# Patient Record
Sex: FEMALE | Race: WHITE
[De-identification: names, ages, dates, MRNs, and addresses within clinical notes are randomized per-mention and may not be internally consistent; named-entity substitution may affect disease eponyms.]

---

## 2018-02-05 ENCOUNTER — HOSPITAL ENCOUNTER (OUTPATIENT)
Dept: HOSPITAL 62 - LC | Age: 28
Discharge: HOME | End: 2018-02-05
Attending: OBSTETRICS & GYNECOLOGY
Payer: COMMERCIAL

## 2018-02-05 DIAGNOSIS — O48.0: Primary | ICD-10-CM

## 2018-02-05 DIAGNOSIS — Z3A.40: ICD-10-CM

## 2018-02-05 PROCEDURE — 59025 FETAL NON-STRESS TEST: CPT

## 2018-02-05 PROCEDURE — 4A1HXCZ MONITORING OF PRODUCTS OF CONCEPTION, CARDIAC RATE, EXTERNAL APPROACH: ICD-10-PCS | Performed by: OBSTETRICS & GYNECOLOGY

## 2018-02-05 NOTE — NON STRESS TEST REPORT
=================================================================

Non Stress Test

=================================================================

Datetime Report Generated by CPN: 02/05/2018 12:40

   

   

=================================================================

DEMOGRAPHIC

=================================================================

   

EGA NST:  40.5

   

=================================================================

INDICATION

=================================================================

   

Indication for Study:  Ordered by Provider

   

=================================================================

MONITORING

=================================================================

   

Monitor Explained:  Monitor Explained; Test Explained; Patient

   Verbalized Understanding

Time on Monitor:  02/05/2018 11:46

Time off Monitor:  02/05/2018 12:11

NST Duration:  25

   

=================================================================

NST INTERVENTIONS

=================================================================

   

NST Interventions:  PO Hydration; Reposition Patient

Physician Notified NST:  H.Arthur, CNM

BABY A:  [id number]

   

=================================================================

BABY A

=================================================================

   

Fetal Movement :  Present

Contraction Frequency :  None

FHR Baseline :  125

Accelerations :  15X15

Decelerations :  None

Variability :  Moderate 6-25bpm

NST Review:  Meets Criteria for Reactive NST

NST Review and Verified By :  FINESSE LONDONO Results:  Reactive

   

=================================================================

NST REPORT

=================================================================

   

Report Trigger:  Send Report

## 2018-02-06 ENCOUNTER — HOSPITAL ENCOUNTER (INPATIENT)
Dept: HOSPITAL 62 - LR | Age: 28
LOS: 2 days | Discharge: HOME | End: 2018-02-08
Attending: OBSTETRICS & GYNECOLOGY | Admitting: OBSTETRICS & GYNECOLOGY
Payer: COMMERCIAL

## 2018-02-06 DIAGNOSIS — Z3A.40: ICD-10-CM

## 2018-02-06 DIAGNOSIS — Z87.891: ICD-10-CM

## 2018-02-06 DIAGNOSIS — O48.0: ICD-10-CM

## 2018-02-06 DIAGNOSIS — Z67.41: ICD-10-CM

## 2018-02-06 DIAGNOSIS — O26.893: ICD-10-CM

## 2018-02-06 LAB
ADD MANUAL DIFF: NO
APPEARANCE UR: (no result)
APTT PPP: YELLOW S
BARBITURATES UR QL SCN: NEGATIVE
BASOPHILS # BLD AUTO: 0 10^3/UL (ref 0–0.2)
BASOPHILS NFR BLD AUTO: 0.2 % (ref 0–2)
BILIRUB UR QL STRIP: NEGATIVE
EOSINOPHIL # BLD AUTO: 0 10^3/UL (ref 0–0.6)
EOSINOPHIL NFR BLD AUTO: 0.4 % (ref 0–6)
ERYTHROCYTE [DISTWIDTH] IN BLOOD BY AUTOMATED COUNT: 13.9 % (ref 11.5–14)
GLUCOSE UR STRIP-MCNC: NEGATIVE MG/DL
HCT VFR BLD CALC: 34.5 % (ref 36–47)
HGB BLD-MCNC: 11.4 G/DL (ref 12–15.5)
KETONES UR STRIP-MCNC: NEGATIVE MG/DL
LYMPHOCYTES # BLD AUTO: 2.2 10^3/UL (ref 0.5–4.7)
LYMPHOCYTES NFR BLD AUTO: 23.3 % (ref 13–45)
MCH RBC QN AUTO: 29 PG (ref 27–33.4)
MCHC RBC AUTO-ENTMCNC: 33.1 G/DL (ref 32–36)
MCV RBC AUTO: 88 FL (ref 80–97)
METHADONE UR QL SCN: NEGATIVE
MONOCYTES # BLD AUTO: 0.8 10^3/UL (ref 0.1–1.4)
MONOCYTES NFR BLD AUTO: 8.3 % (ref 3–13)
NEUTROPHILS # BLD AUTO: 6.3 10^3/UL (ref 1.7–8.2)
NEUTS SEG NFR BLD AUTO: 67.8 % (ref 42–78)
NITRITE UR QL STRIP: NEGATIVE
PCP UR QL SCN: NEGATIVE
PH UR STRIP: 5 [PH] (ref 5–9)
PLATELET # BLD: 156 10^3/UL (ref 150–450)
PROT UR STRIP-MCNC: NEGATIVE MG/DL
RBC # BLD AUTO: 3.93 10^6/UL (ref 3.72–5.28)
SP GR UR STRIP: 1.02
TOTAL CELLS COUNTED % (AUTO): 100 %
URINE AMPHETAMINES SCREEN: NEGATIVE
URINE BENZODIAZEPINES SCREEN: NEGATIVE
URINE COCAINE SCREEN: NEGATIVE
URINE MARIJUANA (THC) SCREEN: NEGATIVE
UROBILINOGEN UR-MCNC: NEGATIVE MG/DL (ref ?–2)
WBC # BLD AUTO: 9.2 10^3/UL (ref 4–10.5)

## 2018-02-06 PROCEDURE — 86850 RBC ANTIBODY SCREEN: CPT

## 2018-02-06 PROCEDURE — 85025 COMPLETE CBC W/AUTO DIFF WBC: CPT

## 2018-02-06 PROCEDURE — 86900 BLOOD TYPING SEROLOGIC ABO: CPT

## 2018-02-06 PROCEDURE — 81005 URINALYSIS: CPT

## 2018-02-06 PROCEDURE — 85027 COMPLETE CBC AUTOMATED: CPT

## 2018-02-06 PROCEDURE — 0KQM0ZZ REPAIR PERINEUM MUSCLE, OPEN APPROACH: ICD-10-PCS | Performed by: ADVANCED PRACTICE MIDWIFE

## 2018-02-06 PROCEDURE — 80307 DRUG TEST PRSMV CHEM ANLYZR: CPT

## 2018-02-06 PROCEDURE — 82962 GLUCOSE BLOOD TEST: CPT

## 2018-02-06 PROCEDURE — 36415 COLL VENOUS BLD VENIPUNCTURE: CPT

## 2018-02-06 PROCEDURE — 85461 HEMOGLOBIN FETAL: CPT

## 2018-02-06 PROCEDURE — 86901 BLOOD TYPING SEROLOGIC RH(D): CPT

## 2018-02-06 PROCEDURE — 4A1HXCZ MONITORING OF PRODUCTS OF CONCEPTION, CARDIAC RATE, EXTERNAL APPROACH: ICD-10-PCS | Performed by: ADVANCED PRACTICE MIDWIFE

## 2018-02-06 PROCEDURE — 86592 SYPHILIS TEST NON-TREP QUAL: CPT

## 2018-02-06 RX ADMIN — IBUPROFEN SCH MG: 800 TABLET, FILM COATED ORAL at 21:04

## 2018-02-06 RX ADMIN — DOCUSATE SODIUM SCH MG: 100 CAPSULE, LIQUID FILLED ORAL at 17:52

## 2018-02-06 RX ADMIN — FERROUS SULFATE TAB 325 MG (65 MG ELEMENTAL FE) SCH MG: 325 (65 FE) TAB at 17:52

## 2018-02-06 RX ADMIN — IBUPROFEN SCH MG: 800 TABLET, FILM COATED ORAL at 18:13

## 2018-02-06 NOTE — L&D PROGRESS NOTES
=================================================================

PROGRESS NOTES

=================================================================

Datetime Report Generated by CPN: 02/06/2018 09:47

   

   

=================================================================

PROGRESS NOTE

=================================================================

   

Impression:  Reassuring Fetal Heart Rate

Plan:  Continue Present Management; Induction

Vital Signs :  Reviewed; Within Normal Limits

Comment:  Cat 1 strip, irregular uc's

   Plan: ROM, ROM

   

=================================================================

FETUS A

=================================================================

   

FHR - Baseline:  120

Monitoring:  External US

Variability:  Moderate 6-25bpm

Accelerations:  15X15

Decelerations:  None

FHR Category:  Category I

   

=================================================================

SIGNATURE

=================================================================

   

SIGNATURE:  [id number]

SIGNATURE:  [id number]

Assignment:  Trip Montano MD

Signature:  Electronically signed by Princess Macias CNM on 2/6/2018 at

   09:46  with User ID: [email or web address]

:  Electronically signed by Princess Macias CNM on 2/6/2018 at 09:46  with

   User ID: [email or web address]

## 2018-02-06 NOTE — ADMISSION PHYSICAL
=================================================================



=================================================================

Datetime Report Generated by CPN: 2018 14:26

   

   

=================================================================

CURRENT ADMISSION

=================================================================

   

Indication for Induction:  Post Dates; Maternal Diabetes

Indication for Induction:  Postterm, Intrauterine Pregnancy; Intact

   Membranes; Induction of Labor

Admit Plan:  Admit to Unit; Initiate Labor Induction Protocol

   

=================================================================

ALLERGIES

=================================================================

   

Medication Allergies:  No

Medication Allergies:  No Known Allergies (2018)

Medication Allergies:  No Known Allergies (2018)

Medication Allergies:  None

Latex:  No Latex Allergies

Food Allergies:  N/A

Environmental Allergies:  N/A

   

=================================================================

OBSTETRICAL HISTORY

=================================================================

   

EDC:  2018 00:00

:  2

Para:  1

Term:  1

:  0

SAB:  0

IAB:  0

Ectopic:  0

Livin

Cesareans:  0

VBACs:  0

Multiple Births:  0

Gestational Diabetes:  Yes

Rh Sensitization:  No

Incompetent Cervix:  No

ARMIDA:  No

Infertility:  No

ART Treatment:  No

Uterine Anomaly:  No

IUGR:  No

Hx Previous C/S:  No

Macrosomia:  No

Hx Loss/Stillborn:  No

PIH:  No

Hx  Death:  No

Placenta Previa/Abruption:  No

Depression/PP Depression:  No

PTL/PROM:  No

Post Partum Hemorrhage:  No

Current Pregnancy Procedures:  Ultrasound; NST

Obstetrical History Comments:  G1 - , Girl, 39.6 weeks

   G2 - Current pregnancy, GDM

   

=================================================================

***SEE PRENATAL RECORDS***

=================================================================

   

Alcohol:  No

Marijuana :  No

Cocaine:  No

Other Illicit Drugs:  No

Cigarettes:  Former Smoker. [id number]

   

=================================================================

MEDICAL HISTORY

=================================================================

   

Diabetes:  Yes

Diabetes Type:  Gestational Diabetes

Blood Transfusion:  No

Pulmonary Disease (Asthma, TB):  No

Breast Disease:  No

Hypertension:  No

Gyn Surgery:  No

Heart Disease:  No

Hosp/Surgery:  Yes

Autoimmune Disorder:  No

Anesthetic Complications:  No

Kidney Disease:  No

Abnormal Pap Smear:  No

Neuro/Epilepsy:  No

Psychiatric Disorders:  No

Other Medical Diseases:  No

Hepatitis/Liver Disease:  No

Significant Family History:  No

Varicosities/Phlebitis:  No

Trauma/Violence :  No

Thyroid Dysfunction:  No

Medical History Comments:  Childbirth , GDM this pregnancy

   

=================================================================

INFECTIOUS HISTORY

=================================================================

   

Gonorrhea:  No

Genital Herpes:  No

Chlamydia:  No

Tuberculosis:  No

Syphilis:  No

Hepatitis:  No

HIV/AIDS Exposure:  No

Rash or Viral Illness:  No

HPV:  No

   

=================================================================

PHYSICAL EXAM

=================================================================

   

General:  Normal

HEENT:  Deferred

Neurologic:  Normal

Thyroid:  Normal

Heart:  Normal

Lungs:  Normal

Breast:  Deferred

Back:  Normal

Abdomen:  Normal

Genitourinary Exam:  Normal

Extremities:  Normal

DTRs:  Normal

Pelvic Type:  Adequate

Physical Exam Comments:  GBS Neg, 40.6

   GDM

   BS this am 77

   

=================================================================

FETUS A

=================================================================

   

Monitoring:  External US

FHR- Baseline:  120

Variability:  Moderate 6-25bpm

Accelerations:  15X15

Decelerations:  None

FHR Category:  Category I

Admit Comment:  Admitted to LD for IOL for GDM and post dates

   VE ? baby high and unable to find cervix,post

   Irreg uc's, explained to pt and hsb we will wait until head is down

   to 0 station to ROM, tolerating contractions well, does not want

   epidural unless needed

   

=================================================================

PLANS FOR LABOR AND DELIVERY

=================================================================

   

Labor and Delivery:  None

Pain Management:  Natural

Feeding Preference:  Both

Benefit of Breast Feed Discussed:  Yes

Circumcision:  Yes

   

=================================================================

INFORMED CONSENT

=================================================================

   

Assignment:  Trip Montano MD

Signature:  Electronically signed by Princess Macias CNM on 2018 at

   09:57  with User ID: [email or web address]

:  Electronically signed by Princess Macias CNM on 2018 at 09:57  with

   User ID: [email or web address]

## 2018-02-06 NOTE — DELIVERY SUMMARY
=================================================================

Del Sum A-C

=================================================================

Datetime Report Generated by CPN: 2018 13:02

   

   

=================================================================

DELIVERY PERSONNEL

=================================================================

   

DELIVERY PERSONNEL:  [id number]

Delivery Doctor::  Princess Macias CNM

Nurse Midwife Certified::  Princess Macias CNM

Labor and Delivery Nurse::  Sofia Starkey RN

Labor and Delivery Nurse::  REILLY Nolan

Scrub Tech/CNA:  ST Linda

Additional Personnel: :  Carli Barbosa RN

   

=================================================================

MATERNAL INFORMATION

=================================================================

   

Delivery Anesthesia:  None

Medications After Delivery:  Pitocin Bolus-Please Comment; Pitocin Drip

   20 Units/1000ml NSS

Meds After Delivery Comment:  Pitocin 20 units in 1 L NS, bolusing per

   order

Estimated  Blood Loss (ml):  400

Maternal Complications:  None

Provider Comments:   viable male from OA to JOSE, after delivery of

   head, contraction was over, poor maternal effort, shoulder dystocia

   dx, Mc Grewal, Suprapubic pressure, call for help, no lateral

   traction, no fundal pressure, rt ant shoulder aneflexed and delivery

   of infant after 1 min 26 seconds . Placed on mothers abd, crying,

   family at BS

   moving all extremeties, facial bruising

   Spont delivery of grossly nl intact placenta, 2 VC, EBL 400cc,

   vaginal laceration repaired with 2-0 chromic without difficulty,

   bleeding controlled

   Cytotec 1000 mcg via rectum, Massage and Pitocin

   Both remain in recovery in stable condition

   

=================================================================

LABOR SUMMARY

=================================================================

   

EDC:  2018 00:00

No. Babies in Womb:  1

 Attempted:  No

Labor Anesthesia:  None

   

=================================================================

LABOR INFORMATION

=================================================================

   

Reason for Induction:  Post Dates

Reason for Induction- Other:  GDM

Onset of Labor:  2018 10:45

Complete Dilatation:  2018 11:58

Oxytocin:  Induction

Group B Beta Strep:  negative

Antibiotics # of Doses:  0

Steroids Given:  None

Reason Steroids Not Administered:  Not Applicable

   

=================================================================

MEMBRANES

=================================================================

   

Membranes Rupture Method:  Spontaneous

Rupture of Membranes:  2018 10:46

Length of Rupture (hr):  1.30

Amniotic Fluid Color:  Clear

Amniotic Fluid Amount:  Small

Amniotic Fluid Odor:  Normal

   

=================================================================

STAGES OF LABOR

=================================================================

   

Stage 1 hr:  1

Stage 1 min:  13

Stage 2 hr:  0

Stage 2 min:  6

Stage 3 hr:  0

Stage 3 min:  5

Total Time in Labor hr:  1

Total Time in Labor min:  24

   

=================================================================

VAGINAL DELIVERY

=================================================================

   

Episiotomy:  None

Laceration #1:  Vaginal

Laceration Extension #1:  Second Degree

Laceration Repair:  Yes

Laceration Repair Note:  2-0 chromic for vaginal repair without

   difficulty using 1% Xylocaine

Sponge Count Correct:  N/A

Sharps Count Correct:  N/A

   

=================================================================

CSECTION DELIVERY

=================================================================

   

Primary Indication:  N/A

Secondary Indication:  N/A

CSection Incidence:  N/A

Labor:  N/A

Elective:  N/A

CSection Incision:  N/A

   

=================================================================

BABY A INFORMATION

=================================================================

   

Infant Delivery Date/Time:  2018 12:04

Method of Delivery:  Vaginal

Born in Route :  No

:  N/A

Forceps:  N/A

Vacuum Extraction:  N/A

Shoulder Dystocia :  Yes

   

=================================================================

SHOULDER DYSTOCIA BABY A

=================================================================

   

Delivery of Head:  2018 12:03

Time Head to Delivery :  1.0

1st Intervention to Resolve:  Gentle Attempt at Traction, Assisted by

   Maternal Expulsive Efforts

2nd Intervention to Resolve:  McRobert's Maneuver

3rd Intervention to Resolve:  Suprapubic Pressure

Verify NO Fundal Pressure:  No Fundal Pressure Applied

Arm Under Symphisis at Del:  Right

   

=================================================================

PRESENTATION/POSITION BABY A

=================================================================

   

Presentation:  Cephalic

Cephalic Presentation:  Vertex

Vertex Position:  Right Occipital Anterior

Breech Presentation:  N/A

   

=================================================================

PLACENTA INFORMATION BABY A

=================================================================

   

Placenta Delivery Time :  2018 12:09

Placenta Method of Delivery:  Spontaneous

Placenta Status:  Delivered

   

=================================================================

APGAR SCORES BABY A

=================================================================

   

Heart Rate 1 min:  >100 bpm

Resp Effort 1 min:  Slow, Irregular

Reflex Irritability 1 min:  Cough or Sneeze or Pulls Away

Muscle Tone 1 min:  Active Motion

Color 1 min:  Blue/Pale

Resuscitation Effort 1 min:  Tactile Stimulation

APGAR SCORE 1 MIN:  7

Heart Rate 5 min:  >100 bpm

Resp Effort 5 min:  Good Cry

Reflex Irritability 5 min:  Cough or Sneeze or Pulls Away

Muscle Tone 5 min:  Active Motion

Color 5 min:  Body Pink, Extremities Blue

Resuscitation Effort 5 min:  N/A

APGAR SCORE 5 MIN:  9

Resuscitation Effort 10 min:  N/A

   

=================================================================

INFANT INFORMATION BABY A

=================================================================

   

Gestational Age at Delivery:  40.6

Gestational Status:  Full Term- 39- 40.6 Weeks

Infant Outcome :  Liveborn

Infant Condition :  Stable

Infant Sex:  Male

   

=================================================================

IDENTIFICATION BABY A

=================================================================

   

Infant Verification Date/Time:  2018 12:17

ID Band Number:  [id number]

Mother's Name Verified:  Yes

Infant Medical Record Number:  [id number]

RN Verifying Infant:  ROD Barbosa, RN / JDiana Stephenson, RN

   

=================================================================

WEIGHT/LENGTH BABY A

=================================================================

   

Infant Birthweight (gm):  4210

Infant Weight (lb):  9

Infant Weight (oz):  5

Infant Length (in):  18.50

Infant Length (cm):  46.99

   

=================================================================

CORD INFORMATION BABY A

=================================================================

   

No. Cord Vessels:  3

Nuchal Cord :  Around Neck x1, Loose

Cord Blood Taken:  Yes-For Eval (Mom's Blood Type - or O+)

Infant Suction:  Mouth

   

=================================================================

ASSESSMENT BABY A

=================================================================

   

Infant Complications:  Shoulder Dystocia

Physical Findings at Delivery:  Bruising

Physical Findings- Other:  facial bruising 

Infant Respirations:  Appears Normal

Skin to Skin:  Yes

Skin to Skin Time (min):  45

Infant Care By:  BELLA Barbosa RN

Transferred To:  Remains with Mother

   

=================================================================

BABY B INFORMATION

=================================================================

   

 :  N/A

## 2018-02-06 NOTE — L&D PROGRESS NOTES
=================================================================

PROGRESS NOTES

=================================================================

Datetime Report Generated by CPN: 02/06/2018 11:57

   

   

=================================================================

PROGRESS NOTE

=================================================================

   

Comment:  feeling alot of pressure and pain, VE, floppy rim, 0, -1,

   variables, uc's q 2 1/2

   

=================================================================

FETUS A

=================================================================

   

:  40.6

   

=================================================================

FETUS C

=================================================================

   

SIGNATURE:  [id number]

Assignment:  Trip Montano MD

Signature:  Electronically signed by Princess Macias CNM on 2/6/2018 at

   11:56  with User ID: [email or web address]

:  Electronically signed by Princess Macias CNM on 2/6/2018 at 11:56  with

   User ID: [email or web address]

## 2018-02-07 LAB
ERYTHROCYTE [DISTWIDTH] IN BLOOD BY AUTOMATED COUNT: 14.6 % (ref 11.5–14)
HCT VFR BLD CALC: 32 % (ref 36–47)
HGB BLD-MCNC: 10.7 G/DL (ref 12–15.5)
MCH RBC QN AUTO: 29.4 PG (ref 27–33.4)
MCHC RBC AUTO-ENTMCNC: 33.5 G/DL (ref 32–36)
MCV RBC AUTO: 88 FL (ref 80–97)
PLATELET # BLD: 141 10^3/UL (ref 150–450)
RBC # BLD AUTO: 3.65 10^6/UL (ref 3.72–5.28)
WBC # BLD AUTO: 10.4 10^3/UL (ref 4–10.5)

## 2018-02-07 PROCEDURE — 3E0234Z INTRODUCTION OF SERUM, TOXOID AND VACCINE INTO MUSCLE, PERCUTANEOUS APPROACH: ICD-10-PCS | Performed by: ADVANCED PRACTICE MIDWIFE

## 2018-02-07 RX ADMIN — IBUPROFEN SCH MG: 800 TABLET, FILM COATED ORAL at 14:43

## 2018-02-07 RX ADMIN — FERROUS SULFATE TAB 325 MG (65 MG ELEMENTAL FE) SCH MG: 325 (65 FE) TAB at 10:00

## 2018-02-07 RX ADMIN — SENNOSIDES, DOCUSATE SODIUM SCH EACH: 50; 8.6 TABLET, FILM COATED ORAL at 10:00

## 2018-02-07 RX ADMIN — Medication SCH CAP: at 10:00

## 2018-02-07 RX ADMIN — DOCUSATE SODIUM SCH MG: 100 CAPSULE, LIQUID FILLED ORAL at 19:15

## 2018-02-07 RX ADMIN — FERROUS SULFATE TAB 325 MG (65 MG ELEMENTAL FE) SCH MG: 325 (65 FE) TAB at 19:16

## 2018-02-07 RX ADMIN — DOCUSATE SODIUM SCH MG: 100 CAPSULE, LIQUID FILLED ORAL at 10:00

## 2018-02-07 RX ADMIN — IBUPROFEN SCH MG: 800 TABLET, FILM COATED ORAL at 05:59

## 2018-02-07 RX ADMIN — IBUPROFEN SCH MG: 800 TABLET, FILM COATED ORAL at 21:56

## 2018-02-07 NOTE — PDOC PROGRESS REPORT
Subjective-OB


Progress Note for:: 02/07/18





Physical Exam (OB)


Vital Signs: 


 











Temp Pulse Resp BP Pulse Ox


 


 98.5 F   71   15   107/71   99 


 


 02/07/18 08:25  02/07/18 08:25  02/07/18 08:25  02/07/18 08:25  02/07/18 08:25








 Intake & Output











 02/06/18 02/07/18 02/08/18





 06:59 06:59 06:59


 


Weight 100.1 kg  














- Abdomen


Description: Soft


Hernia Present: No


Fundal Description: Firm, Midline


Fundal Height: u/u - u/2





- Abdominal


Tenderness: Nontender





- Extremities


Lower extremities: Kody's sign - neg


Calf: Normal, Nontender





Objective-Diagnostic


Laboratory: 


 





 02/07/18 07:13 





 











  02/07/18 02/07/18





  07:13 07:13


 


WBC  10.4 


 


RBC  3.65 L 


 


Hgb  10.7 L 


 


Hct  32.0 L 


 


MCV  88 


 


MCH  29.4 


 


MCHC  33.5 


 


RDW  14.6 H 


 


Plt Count  141 L 


 


Blood Type   O NEGATIVE














Assessment and Plan(PN)





- Assessment and Plan


(1) Vaginal delivery


Is this a current diagnosis for this admission?: Yes   





- Time Spent with Patient


Time with patient: Less than 15 minutes





- Disposition


Anticipated Discharge: Home


Within: within 24 hours

## 2018-02-08 VITALS — DIASTOLIC BLOOD PRESSURE: 57 MMHG | SYSTOLIC BLOOD PRESSURE: 98 MMHG

## 2018-02-08 RX ADMIN — SENNOSIDES, DOCUSATE SODIUM SCH EACH: 50; 8.6 TABLET, FILM COATED ORAL at 10:39

## 2018-02-08 RX ADMIN — DOCUSATE SODIUM SCH MG: 100 CAPSULE, LIQUID FILLED ORAL at 10:39

## 2018-02-08 RX ADMIN — IBUPROFEN SCH MG: 800 TABLET, FILM COATED ORAL at 05:33

## 2018-02-08 RX ADMIN — Medication SCH CAP: at 10:39

## 2018-02-08 RX ADMIN — FERROUS SULFATE TAB 325 MG (65 MG ELEMENTAL FE) SCH MG: 325 (65 FE) TAB at 10:39

## 2018-02-08 NOTE — PDOC PROGRESS REPORT
Subjective-OB


Progress Note for:: 02/08/18


Subjective: 





Ready for discharge.





Physical Exam (OB)


Vital Signs: 


 











Temp Pulse Resp BP Pulse Ox


 


 97.7 F   73   15   98/57 L  99 


 


 02/08/18 11:06  02/08/18 11:06  02/08/18 11:06  02/08/18 11:06  02/08/18 11:06








 Intake & Output











 02/07/18 02/08/18 02/09/18





 06:59 06:59 06:59


 


Intake Total  350 300


 


Balance  350 300














- PIH/Pre-Eclampsia


DTR's: 2 +


Clonus: Negative


Headache: Absent


Epigastric Pain: No


Visual Changes: No





- Lochia


Lochia Amount: Scant < 10 ml


Lochia Color: Rubra/Red





- Abdomen


Description: Soft, Flat


Hernia Present: No


Bowel Sounds: Normoactive


Flatus Presence: Present


Stool: No


Fundal Description: Firm, Midline


Fundal Height: u/u - u/2





Objective-Diagnostic


Laboratory: 


 





 02/07/18 07:13 





 











  02/07/18





  07:13


 


Blood Type  O NEGATIVE














Assessment and Plan(PN)





- Disposition


Anticipated Discharge: Home

## 2018-02-08 NOTE — PDOC DISCHARGE SUMMARY
Final Diagnosis


Discharge Date: 18





- Final Diagnosis


(1) GDM (gestational diabetes mellitus)


Is this a current diagnosis for this admission?: Yes   





(2) Obesity


Is this a current diagnosis for this admission?: Yes   





(3) Pregnancy


Is this a current diagnosis for this admission?: Yes   





(4) Vaginal delivery


Is this a current diagnosis for this admission?: Yes   





Discharge Data





- Discharge Medication


Home Medications: 








Prenatal No122/Iron/Folic Acid [Prenatal Multi Tablet] 1 each PO DAILY 18 








Gestational Age: 40.6 wks


Reason(s) for Admission: Induction of Labor, Gestional Diabetes


Prenatal Procedures: Ultrasound


Intrapartum Procedure(s): Spontaneous Vaginal Delivery


Postpartum Complication(s): Laceration-Vaginal


Laceration-Degree: 2nd





-  Data


  ** Baby 1 Male


Apgar at 1 minute: 7


Apgar at 5 minutes: 9


Weight: 4.224 kg


Home with Mother: Yes


Complications: No





- Diagnosis Test


Laboratory: 


 











Temp Pulse Resp BP Pulse Ox


 


 97.7 F   73   15   98/57 L  99 


 


 18 11:06  18 11:06  18 11:06  18 11:06  18 11:06








 











  18





  06:40 07:02 07:13


 


RBC   3.93  3.65 L


 


Hgb   11.4 L  10.7 L


 


Hct   34.5 L  32.0 L


 


Urine Opiates Screen  NEGATIVE  














- Discharge information/Instructions


Discharge Activity: Activity As Tolerated, Balance Activity w/Rest, Pelvic Rest

, Slowly Increase Activity, No tub bath


Discharge Diet: Regular


Disposition: HOME, SELF-CARE


Follow up with: [facility]


in: 4, Weeks

## 2018-02-08 NOTE — PDOC DISCHARGE SUMMARY
Final Diagnosis


Discharge Date: 18





- Final Diagnosis


(1) GDM (gestational diabetes mellitus)


Is this a current diagnosis for this admission?: Yes   





(2) Obesity


Is this a current diagnosis for this admission?: Yes   





(3) Pregnancy


Is this a current diagnosis for this admission?: Yes   





(4) Vaginal delivery


Is this a current diagnosis for this admission?: Yes   





Discharge Data





- Discharge Medication


Home Medications: 








Prenatal No122/Iron/Folic Acid [Prenatal Multi Tablet] 1 each PO DAILY 18 








Gestational Age: 40.6 wks


Reason(s) for Admission: Induction of Labor, Gestional Diabetes


Prenatal Procedures: Ultrasound


Intrapartum Procedure(s): Spontaneous Vaginal Delivery


Postpartum Complication(s): Laceration-Vaginal


Laceration-Degree: 2nd





-  Data


  ** Baby 1 Male


Apgar at 1 minute: 7


Apgar at 5 minutes: 9


Weight: 4.224 kg


Home with Mother: Yes


Complications: Yes - Shoulder dystocia





- Diagnosis Test


Laboratory: 


 











Temp Pulse Resp BP Pulse Ox


 


 97.7 F   73   15   98/57 L  99 


 


 18 11:06  18 11:06  18 11:06  18 11:06  18 11:06








 











  18





  06:40 07:02 07:13


 


RBC   3.93  3.65 L


 


Hgb   11.4 L  10.7 L


 


Hct   34.5 L  32.0 L


 


Urine Opiates Screen  NEGATIVE  














- Discharge information/Instructions


Discharge Activity: Activity As Tolerated, Balance Activity w/Rest, Pelvic Rest

, Slowly Increase Activity, No tub bath


Discharge Diet: Regular


Disposition: HOME, SELF-CARE


Follow up with: [facility]


in: 4, Weeks